# Patient Record
Sex: FEMALE | Race: WHITE | NOT HISPANIC OR LATINO | URBAN - METROPOLITAN AREA
[De-identification: names, ages, dates, MRNs, and addresses within clinical notes are randomized per-mention and may not be internally consistent; named-entity substitution may affect disease eponyms.]

---

## 2017-10-14 ENCOUNTER — EMERGENCY (EMERGENCY)
Facility: HOSPITAL | Age: 42
LOS: 1 days | Discharge: HOME | End: 2017-10-14
Attending: EMERGENCY MEDICINE | Admitting: EMERGENCY MEDICINE
Payer: COMMERCIAL

## 2017-10-14 VITALS
SYSTOLIC BLOOD PRESSURE: 141 MMHG | OXYGEN SATURATION: 100 % | RESPIRATION RATE: 18 BRPM | TEMPERATURE: 98 F | HEART RATE: 58 BPM | DIASTOLIC BLOOD PRESSURE: 65 MMHG

## 2017-10-14 VITALS
HEIGHT: 67 IN | WEIGHT: 149.91 LBS | TEMPERATURE: 98 F | DIASTOLIC BLOOD PRESSURE: 87 MMHG | RESPIRATION RATE: 17 BRPM | SYSTOLIC BLOOD PRESSURE: 120 MMHG | OXYGEN SATURATION: 100 % | HEART RATE: 87 BPM

## 2017-10-14 LAB
ALBUMIN SERPL ELPH-MCNC: 4.2 G/DL — SIGNIFICANT CHANGE UP (ref 3.4–5)
ALP SERPL-CCNC: 69 U/L — SIGNIFICANT CHANGE UP (ref 40–120)
ALT FLD-CCNC: 58 U/L — HIGH (ref 12–42)
ANION GAP SERPL CALC-SCNC: 10 MMOL/L — SIGNIFICANT CHANGE UP (ref 9–16)
APTT BLD: 29.3 SEC — SIGNIFICANT CHANGE UP (ref 27.5–36.5)
AST SERPL-CCNC: 54 U/L — HIGH (ref 15–37)
BASOPHILS NFR BLD AUTO: 0.4 % — SIGNIFICANT CHANGE UP (ref 0–2)
BILIRUB SERPL-MCNC: 0.3 MG/DL — SIGNIFICANT CHANGE UP (ref 0.2–1.2)
BUN SERPL-MCNC: 16 MG/DL — SIGNIFICANT CHANGE UP (ref 7–23)
CALCIUM SERPL-MCNC: 9.2 MG/DL — SIGNIFICANT CHANGE UP (ref 8.5–10.5)
CHLORIDE SERPL-SCNC: 103 MMOL/L — SIGNIFICANT CHANGE UP (ref 96–108)
CO2 SERPL-SCNC: 25 MMOL/L — SIGNIFICANT CHANGE UP (ref 22–31)
CREAT SERPL-MCNC: 0.73 MG/DL — SIGNIFICANT CHANGE UP (ref 0.5–1.3)
EOSINOPHIL NFR BLD AUTO: 2 % — SIGNIFICANT CHANGE UP (ref 0–6)
GLUCOSE SERPL-MCNC: 116 MG/DL — HIGH (ref 70–99)
HCG SERPL-ACNC: 7 MIU/ML — SIGNIFICANT CHANGE UP
HCT VFR BLD CALC: 34.7 % — SIGNIFICANT CHANGE UP (ref 34.5–45)
HGB BLD-MCNC: 12 G/DL — SIGNIFICANT CHANGE UP (ref 11.5–15.5)
IMM GRANULOCYTES NFR BLD AUTO: 0.4 % — SIGNIFICANT CHANGE UP (ref 0–1.5)
INR BLD: 0.97 — SIGNIFICANT CHANGE UP (ref 0.88–1.16)
LYMPHOCYTES # BLD AUTO: 24.4 % — SIGNIFICANT CHANGE UP (ref 13–44)
MCHC RBC-ENTMCNC: 32.3 PG — SIGNIFICANT CHANGE UP (ref 27–34)
MCHC RBC-ENTMCNC: 34.6 G/DL — SIGNIFICANT CHANGE UP (ref 32–36)
MCV RBC AUTO: 93.5 FL — SIGNIFICANT CHANGE UP (ref 80–100)
MONOCYTES NFR BLD AUTO: 8.1 % — SIGNIFICANT CHANGE UP (ref 2–14)
NEUTROPHILS NFR BLD AUTO: 64.7 % — SIGNIFICANT CHANGE UP (ref 43–77)
PLATELET # BLD AUTO: 267 K/UL — SIGNIFICANT CHANGE UP (ref 150–400)
POTASSIUM SERPL-MCNC: 3.8 MMOL/L — SIGNIFICANT CHANGE UP (ref 3.5–5.3)
POTASSIUM SERPL-SCNC: 3.8 MMOL/L — SIGNIFICANT CHANGE UP (ref 3.5–5.3)
PROT SERPL-MCNC: 7.6 G/DL — SIGNIFICANT CHANGE UP (ref 6.4–8.2)
PROTHROM AB SERPL-ACNC: 10.7 SEC — SIGNIFICANT CHANGE UP (ref 9.8–12.7)
RBC # BLD: 3.71 M/UL — LOW (ref 3.8–5.2)
RBC # FLD: 11.9 % — SIGNIFICANT CHANGE UP (ref 10.3–16.9)
SODIUM SERPL-SCNC: 138 MMOL/L — SIGNIFICANT CHANGE UP (ref 132–145)
WBC # BLD: 7.9 K/UL — SIGNIFICANT CHANGE UP (ref 3.8–10.5)
WBC # FLD AUTO: 7.9 K/UL — SIGNIFICANT CHANGE UP (ref 3.8–10.5)

## 2017-10-14 PROCEDURE — 93010 ELECTROCARDIOGRAM REPORT: CPT | Mod: NC

## 2017-10-14 PROCEDURE — 99285 EMERGENCY DEPT VISIT HI MDM: CPT | Mod: 25

## 2017-10-14 RX ORDER — SODIUM CHLORIDE 9 MG/ML
1000 INJECTION INTRAMUSCULAR; INTRAVENOUS; SUBCUTANEOUS ONCE
Qty: 0 | Refills: 0 | Status: COMPLETED | OUTPATIENT
Start: 2017-10-14 | End: 2017-10-14

## 2017-10-14 RX ADMIN — SODIUM CHLORIDE 1000 MILLILITER(S): 9 INJECTION INTRAMUSCULAR; INTRAVENOUS; SUBCUTANEOUS at 21:07

## 2017-10-14 NOTE — ED ADULT NURSE NOTE - OBJECTIVE STATEMENT
42y female, with complaint of vaginal bleeding. Pt is , came in the ed ambulatory, with complaint of vaginal bleeding which happened 30mins PTA. State she was at the gym, when she starting having heavy vaginal bleeding. Pt is 6weeks post partum, on lovenox. Pt also said during her delivery she had a placenta accreta, manual delivery of placenta was done. denies chest pain/SOB/dizziness at this time.  Pt evaluated with by MD Alfaro.18g IV access in placed to right AC. Fluids running.

## 2017-10-14 NOTE — ED PROVIDER NOTE - NOTES
spoke to OB dr wilkerson, advised to send patient to ed for sono and obgyn evaluation. labs are stable for transfer. spoke to dr davis in ed, accepting and radiology resident aware patient history and need for sono.

## 2017-10-14 NOTE — ED ADULT TRIAGE NOTE - CHIEF COMPLAINT QUOTE
patient is 6 mos post partum with placenta accreta and bleeding post partum with  D and C done. Been okay until today when patient had sudden onset of vaginal bleeding.

## 2017-10-14 NOTE — ED PROVIDER NOTE - OBJECTIVE STATEMENT
Patient is a , 6 weeks postpartum after IVF, on lovenox 40 mg SQ daily, presents with acute onset vaginal bleeding while working out at the gym. patient notes bright red blood from vagina, with clots and visible tissue products. patient notes during vaginal delivery, complicated with placenta accreta, and required manual delivery of placenta, no d&c done in OR, and no transfusion. placed on iron after delivery and Hg has improved since then. pregnancy was full term, and otherwise no complication. breastfeeding at this time. denies cp, sob, or palpitations. denies hx of pelvic or abdominal surgeries.

## 2017-10-15 ENCOUNTER — EMERGENCY (EMERGENCY)
Facility: HOSPITAL | Age: 42
LOS: 1 days | Discharge: HOME | End: 2017-10-15
Attending: EMERGENCY MEDICINE | Admitting: EMERGENCY MEDICINE
Payer: COMMERCIAL

## 2017-10-15 VITALS
RESPIRATION RATE: 18 BRPM | OXYGEN SATURATION: 98 % | HEART RATE: 92 BPM | DIASTOLIC BLOOD PRESSURE: 56 MMHG | TEMPERATURE: 97 F | SYSTOLIC BLOOD PRESSURE: 92 MMHG

## 2017-10-15 VITALS
SYSTOLIC BLOOD PRESSURE: 92 MMHG | TEMPERATURE: 98 F | OXYGEN SATURATION: 97 % | HEART RATE: 51 BPM | DIASTOLIC BLOOD PRESSURE: 55 MMHG | RESPIRATION RATE: 18 BRPM

## 2017-10-15 LAB
ANION GAP SERPL CALC-SCNC: 13 MMOL/L — SIGNIFICANT CHANGE UP (ref 5–17)
BASOPHILS NFR BLD AUTO: 0.3 % — SIGNIFICANT CHANGE UP (ref 0–2)
BASOPHILS NFR BLD AUTO: 0.4 % — SIGNIFICANT CHANGE UP (ref 0–2)
BLD GP AB SCN SERPL QL: NEGATIVE — SIGNIFICANT CHANGE UP
BLD GP AB SCN SERPL QL: NEGATIVE — SIGNIFICANT CHANGE UP
BUN SERPL-MCNC: 13 MG/DL — SIGNIFICANT CHANGE UP (ref 7–23)
CALCIUM SERPL-MCNC: 8.8 MG/DL — SIGNIFICANT CHANGE UP (ref 8.4–10.5)
CHLORIDE SERPL-SCNC: 104 MMOL/L — SIGNIFICANT CHANGE UP (ref 96–108)
CO2 SERPL-SCNC: 24 MMOL/L — SIGNIFICANT CHANGE UP (ref 22–31)
CREAT SERPL-MCNC: 0.68 MG/DL — SIGNIFICANT CHANGE UP (ref 0.5–1.3)
EOSINOPHIL NFR BLD AUTO: 2.4 % — SIGNIFICANT CHANGE UP (ref 0–6)
EOSINOPHIL NFR BLD AUTO: 2.7 % — SIGNIFICANT CHANGE UP (ref 0–6)
GLUCOSE SERPL-MCNC: 111 MG/DL — HIGH (ref 70–99)
HCT VFR BLD CALC: 25.4 % — LOW (ref 34.5–45)
HCT VFR BLD CALC: 28.7 % — LOW (ref 34.5–45)
HGB BLD-MCNC: 8.4 G/DL — LOW (ref 11.5–15.5)
HGB BLD-MCNC: 9.7 G/DL — LOW (ref 11.5–15.5)
LYMPHOCYTES # BLD AUTO: 34 % — SIGNIFICANT CHANGE UP (ref 13–44)
LYMPHOCYTES # BLD AUTO: 36.7 % — SIGNIFICANT CHANGE UP (ref 13–44)
MCHC RBC-ENTMCNC: 31.5 PG — SIGNIFICANT CHANGE UP (ref 27–34)
MCHC RBC-ENTMCNC: 31.9 PG — SIGNIFICANT CHANGE UP (ref 27–34)
MCHC RBC-ENTMCNC: 33.1 G/DL — SIGNIFICANT CHANGE UP (ref 32–36)
MCHC RBC-ENTMCNC: 33.8 G/DL — SIGNIFICANT CHANGE UP (ref 32–36)
MCV RBC AUTO: 94.4 FL — SIGNIFICANT CHANGE UP (ref 80–100)
MCV RBC AUTO: 95.1 FL — SIGNIFICANT CHANGE UP (ref 80–100)
MONOCYTES NFR BLD AUTO: 14.1 % — HIGH (ref 2–14)
MONOCYTES NFR BLD AUTO: 9.5 % — SIGNIFICANT CHANGE UP (ref 2–14)
NEUTROPHILS NFR BLD AUTO: 48.8 % — SIGNIFICANT CHANGE UP (ref 43–77)
NEUTROPHILS NFR BLD AUTO: 51.1 % — SIGNIFICANT CHANGE UP (ref 43–77)
PLATELET # BLD AUTO: 219 K/UL — SIGNIFICANT CHANGE UP (ref 150–400)
PLATELET # BLD AUTO: 244 K/UL — SIGNIFICANT CHANGE UP (ref 150–400)
POTASSIUM SERPL-MCNC: 3.5 MMOL/L — SIGNIFICANT CHANGE UP (ref 3.5–5.3)
POTASSIUM SERPL-SCNC: 3.5 MMOL/L — SIGNIFICANT CHANGE UP (ref 3.5–5.3)
RBC # BLD: 2.67 M/UL — LOW (ref 3.8–5.2)
RBC # BLD: 3.04 M/UL — LOW (ref 3.8–5.2)
RBC # FLD: 11.9 % — SIGNIFICANT CHANGE UP (ref 10.3–16.9)
RBC # FLD: 12 % — SIGNIFICANT CHANGE UP (ref 10.3–16.9)
RH IG SCN BLD-IMP: POSITIVE — SIGNIFICANT CHANGE UP
SODIUM SERPL-SCNC: 141 MMOL/L — SIGNIFICANT CHANGE UP (ref 135–145)
WBC # BLD: 5.6 K/UL — SIGNIFICANT CHANGE UP (ref 3.8–10.5)
WBC # BLD: 6.1 K/UL — SIGNIFICANT CHANGE UP (ref 3.8–10.5)
WBC # FLD AUTO: 5.6 K/UL — SIGNIFICANT CHANGE UP (ref 3.8–10.5)
WBC # FLD AUTO: 6.1 K/UL — SIGNIFICANT CHANGE UP (ref 3.8–10.5)

## 2017-10-15 PROCEDURE — 86901 BLOOD TYPING SEROLOGIC RH(D): CPT

## 2017-10-15 PROCEDURE — 80048 BASIC METABOLIC PNL TOTAL CA: CPT

## 2017-10-15 PROCEDURE — 99284 EMERGENCY DEPT VISIT MOD MDM: CPT | Mod: 25

## 2017-10-15 PROCEDURE — 86850 RBC ANTIBODY SCREEN: CPT

## 2017-10-15 PROCEDURE — 76830 TRANSVAGINAL US NON-OB: CPT | Mod: 26

## 2017-10-15 PROCEDURE — 99285 EMERGENCY DEPT VISIT HI MDM: CPT | Mod: 25

## 2017-10-15 PROCEDURE — 84702 CHORIONIC GONADOTROPIN TEST: CPT

## 2017-10-15 PROCEDURE — 86900 BLOOD TYPING SEROLOGIC ABO: CPT

## 2017-10-15 PROCEDURE — 36415 COLL VENOUS BLD VENIPUNCTURE: CPT

## 2017-10-15 PROCEDURE — 76830 TRANSVAGINAL US NON-OB: CPT

## 2017-10-15 PROCEDURE — 85025 COMPLETE CBC W/AUTO DIFF WBC: CPT

## 2017-10-15 RX ORDER — SODIUM CHLORIDE 9 MG/ML
1000 INJECTION INTRAMUSCULAR; INTRAVENOUS; SUBCUTANEOUS ONCE
Qty: 0 | Refills: 0 | Status: COMPLETED | OUTPATIENT
Start: 2017-10-15 | End: 2017-10-15

## 2017-10-15 RX ADMIN — SODIUM CHLORIDE 1000 MILLILITER(S): 9 INJECTION INTRAMUSCULAR; INTRAVENOUS; SUBCUTANEOUS at 00:32

## 2017-10-15 RX ADMIN — Medication 0.2 MILLIGRAM(S): at 05:03

## 2017-10-15 NOTE — ED PROVIDER NOTE - MEDICAL DECISION MAKING DETAILS
post partum vaginal bleeding on lovenox during pregnancy and postpartum hypotension tachy, well appearing - Vag exam by GYN/  TV sono repeat labs post partum vaginal bleeding on lovenox during pregnancy and postpartum hypotension tachy, well appearing - Vag exam by GYN/  TV sono repeat labs   small clot in uterus  no active bleeding seen and eval by GYN  ok for dc  Hb 8.4  102/53  HR 49 pt ambulatory NAD no active bleeding  to see GYN MD in 1 day - return prec dw pt including recurrent bleeding dizziness nausea vomiting temp > 100.$ F

## 2017-10-15 NOTE — CONSULT NOTE ADULT - ASSESSMENT
42y P1 s/p   presents with heavy 42y P1 s/p   presents with heavy vaginal bleeding which has since resolved since being in the ED  - VSS throughout visit but heavy vaginal bleeding witnessed at begining of work up  - Speculum exam showed clot in cervical os and no active bleeding and therefore Hg is stable  - Pt anemic currently and has dropped Hg from 12 to 8.4 but is asymptomatic and bleeding stopped  - Pt took Lovenox less than 24hrs ago   - TVUS shows possible RPOC vs. blood clots  - Pt given the option of being admitted from D&C today or being discharged home to [facility] with OBGYN STAT on . All risks of bleeding given to pt before having her decide  - Pt requests to be discharged and to take train home   - Again VSS, no active bleeding  - Will discharge home with Methergine series and to return here or another ER if bleeding restarts, she fells pain or symptoms of anemia (dizziness or weakness)  - Pt understands and agrees to plan    d/w Dr. Hitchcock

## 2017-10-15 NOTE — ED ADULT NURSE NOTE - OBJECTIVE STATEMENT
Pt transferred from [facility] for GYN CONSULT vis a vis SUDDEN VAG BLEED DURING WORK OUT AT GYM. Same is , PP x 6 months, given LOVENOX 2/2 hx DVT. Hx PLACENTA ACCRETA during pregnancy. Denied other complaint. Arrived to ED in [facility].

## 2017-10-15 NOTE — ED PROVIDER NOTE - OBJECTIVE STATEMENT
41 yo F is a , 6 weeks postpartum after IVF, on Lovenox 40 mg SQ daily- last dose today-- hx of prior DVT 7 years ago while flying back from [place] - presents with acute onset vaginal bleeding while working out at the gym- pos BRB with clots/ tissue ? POC-  no syncope or chest pain or sob  no palpitations-- no abd cramping--. patient notes during vaginal delivery, complicated with placenta accreta, and required manual delivery of placenta, no D/C done in OR, and no transfusion. placed on iron after delivery and Hg has improved since then. pregnancy was full term, and otherwise no complication. breastfeeding at this time. denies cp, sob, or palpitations. denies hx of pelvic or abdominal surgeries.

## 2017-10-15 NOTE — ED PROVIDER NOTE - PROGRESS NOTE DETAILS
[employer] rx given to pt- explained risks assoc with breastfeeding and to infant-advised to use formula for now if at all possible rx given to pt as well

## 2017-10-15 NOTE — CONSULT NOTE ADULT - SUBJECTIVE AND OBJECTIVE BOX
42y  s/p  on  in [place] with OBGYN Dr. Tierney Yoo presents to ED from [facility] with heavy vaginal bleeding after going to the gym. Pt in in [place] for a work event and decided to go to the gyn before. When she was leaving, she felt a gush of fluid and realized it was blood. The bleeding soaked her pants and ran down her leg. At that time she called her OBGYN and went to  ED and was then transferred here. In their ED her Hg was 12 and her VS were stable. She currently denies any n/v, weakness, dizziness, SOB or pain. Pt delivered a healthy baby girl in [place] on  but the delivery was complicated by a placenta accreta with manual removal of placenta. Pt has been on Lovenox since her second trimester due to her hx of DVT about 7 years ago. Her DVT was due to pt having an IUD and being on a plane for 12 hours.     PAST MEDICAL & SURGICAL HISTORY:  Multiple Colonoscopies for abnormal pap  DVT around     MEDICATIONS:  Lovenox 40mg daily  Synthroid 75mcg  PNV    Allergies    aspirin (Rash)  penicillins (Rash)    PHYSICAL EXAM:   Vital Signs Last 24 Hrs  T(C): 36.3 (15 Oct 2017 00:09), Max: 36.7 (14 Oct 2017 20:40)  T(F): 97.4 (15 Oct 2017 00:09), Max: 98 (14 Oct 2017 20:40)  HR: 92 (15 Oct 2017 00:09) (58 - 92)  BP: 92/56 (15 Oct 2017 00:09) (92/56 - 141/65)  BP(mean): --  RR: 18 (15 Oct 2017 00:09) (17 - 18)  SpO2: 98% (15 Oct 2017 00:09) (98% - 100%)    **************************  Constitutional: Alert & Oriented x3, No acute distress, cooperative   Respiratory: Clear to ausculation bilaterally; no wheezing, rhonchi, or crackles  Cardiovascular: S1 &S2 physiologic, no murmurs, or gallops  Gastrointestinal: soft, non tender, positive bowel sounds, no rebound or guarding   Speculum exam:   Pelvic exam:   Extremities: no calf tenderness or swelling      LABS:                        9.7    6.1   )-----------( 244      ( 15 Oct 2017 00:48 )             28.7     10-14    138  |  103  |  16  ----------------------------<  116<H>  3.8   |  25  |  0.73    Ca    9.2      14 Oct 2017 21:11    TPro  7.6  /  Alb  4.2  /  TBili  0.3  /  DBili  x   /  AST  54<H>  /  ALT  58<H>  /  AlkPhos  69  10-14    PT/INR - ( 14 Oct 2017 21:11 )   PT: 10.7 sec;   INR: 0.97          PTT - ( 14 Oct 2017 21:11 )  PTT:29.3 sec      RADIOLOGY & ADDITIONAL STUDIES: 42y  s/p  on  in [place] with OBGYN Dr. Tierney Yoo presents to ED from [facility] with heavy vaginal bleeding after going to the gym. Pt in in [place] for a work event and decided to go to the gyn before. When she was leaving, she felt a gush of fluid and realized it was blood. The bleeding soaked her pants and ran down her leg. At that time she called her OBGYN and went to  ED and was then transferred here. In their ED her Hg was 12 and her VS were stable. She currently denies any n/v, weakness, dizziness, SOB or pain. Pt delivered a healthy baby girl in [place] on  but the delivery was complicated by a placenta accreta with manual removal of placenta. Pt has been on Lovenox since her second trimester due to her hx of DVT about 7 years ago. Her DVT was due to pt having an IUD and being on a plane for 12 hours.     PAST MEDICAL & SURGICAL HISTORY:  Multiple Colonoscopies for abnormal pap  DVT around     MEDICATIONS:  Lovenox 40mg daily  Synthroid 75mcg  PNV    Allergies    aspirin (Rash)  penicillins (Rash)    PHYSICAL EXAM:   Vital Signs Last 24 Hrs  T(C): 36.3 (15 Oct 2017 00:09), Max: 36.7 (14 Oct 2017 20:40)  T(F): 97.4 (15 Oct 2017 00:09), Max: 98 (14 Oct 2017 20:40)  HR: 92 (15 Oct 2017 00:09) (58 - 92)  BP: 92/56 (15 Oct 2017 00:09) (92/56 - 141/65)  BP(mean): --  RR: 18 (15 Oct 2017 00:09) (17 - 18)  SpO2: 98% (15 Oct 2017 00:09) (98% - 100%)    **************************  Constitutional: Alert & Oriented x3, No acute distress, cooperative   Respiratory: Clear to ausculation bilaterally; no wheezing, rhonchi, or crackles  Cardiovascular: S1 &S2 physiologic, no murmurs, or gallops  Gastrointestinal: soft, non tender, positive bowel sounds, no rebound or guarding   Speculum exam: 5cc BRB with seen clot at cervical os. no active bleeding seen  Pelvic exam: No CMT.   Extremities: no calf tenderness or swelling      LABS:                        9.7    6.1   )-----------( 244      ( 15 Oct 2017 00:48 )             28.7     10-14    138  |  103  |  16  ----------------------------<  116<H>  3.8   |  25  |  0.73    Ca    9.2      14 Oct 2017 21:11    TPro  7.6  /  Alb  4.2  /  TBili  0.3  /  DBili  x   /  AST  54<H>  /  ALT  58<H>  /  AlkPhos  69  10-14    PT/INR - ( 14 Oct 2017 21:11 )   PT: 10.7 sec;   INR: 0.97          PTT - ( 14 Oct 2017 21:11 )  PTT:29.3 sec      RADIOLOGY & ADDITIONAL STUDIES:  < from: US Transvaginal (10.15.17 @ 02:15) >    EXAM:  US TRANSVAGINAL                          PROCEDURE DATE:  10/15/2017                     INTERPRETATION:  PELVIC ULTRASOUND - LIMITED dated 10/15/2017 2:15 AM    INDICATION: Vaginal bleeding with clots 6 weeks post partum. Vaginal   delivery. On Lovenox.    TECHNIQUE: Transvaginal views of the pelvis were obtained.      PRIOR STUDIES: None    FINDINGS:   These images demonstrate the uterus to be anteverted. The uterus is   normal in size and shape. Measured dimensions of the uterus are 8.7 x 5.3   x 6.7 cm. Accurate measurement of the uterus is limited as the lower   portion of the uterus is off the field-of-view. No myometrial   abnormalities are seen. A solid, heterogeneous, echogenic mass is within   the uterus. The majority of this mass is minimally vascular, although   there is a focal area of increased vascularity as seen on transverse view   at the junction of the uterine wall and mass. The endometrium is   thickened, measuring 2.3 cm.     The cervix appears closed, measuring 2.7 cm, although part of the cervix   may be off the field-of-view.    The right ovary is normal in size, measuring 2.7 x 2.4 x 1.5 cm. No right   ovarian masses are seen. The left ovary is normal in size, measuring 1.8   x 3.2 x 1.4 cm. No left ovarian masses are seen. Doppler evaluation   demonstrates flow to the right ovary with no evidence of torsion.   Arterial flow is demonstrated in the left ovary. Evaluation of flow to   the left ovary is limited due to patient discomfort.      IMPRESSION:   Solid, heterogeneous, echogenic mass within the uterus. Focal area of   increased vascularity at the junction of the uterine body wall and mass   suggestive of retained products of conception. Intrauterine blood clots   are also possible.            "Thank you for the opportunity to participate in the care of this   patient."    MIGDALIA SMITH M.D., RADIOLOGY RESIDENT  This document has been electronically signed.  JANELL WARNER M.D., ATTENDING RADIOLOGIST  This document has been electronically signed. Oct 15 2017  2:58AM                  < end of copied text > 42y  s/p  on  in [place] with OBGYN Dr. Tierney Yoo presents to ED from [facility] with heavy vaginal bleeding after going to the gym. Pt in in [place] for a work event and decided to go to the gyn before. When she was leaving, she felt a gush of fluid and realized it was blood. The bleeding soaked her pants and ran down her leg. At that time she called her OBGYN and went to  ED and was then transferred here. In their ED her Hg was 12 and her VS were stable. She currently denies any n/v, weakness, dizziness, SOB or pain. Pt delivered a healthy baby girl in [place] on  but the delivery was complicated by a placenta accreta with manual removal of placenta. Pt has been on Lovenox since her second trimester due to her hx of DVT about 7 years ago. Her DVT was due to pt having an IUD and being on a plane for 12 hours.     PAST MEDICAL & SURGICAL HISTORY:  Multiple Colonoscopies for abnormal pap  DVT around     MEDICATIONS:  Lovenox 40mg daily  Synthroid 75mcg  PNV    Allergies    aspirin (Rash)  penicillins (Rash)    PHYSICAL EXAM:   Vital Signs Last 24 Hrs  T(C): 36.3 (15 Oct 2017 00:09), Max: 36.7 (14 Oct 2017 20:40)  T(F): 97.4 (15 Oct 2017 00:09), Max: 98 (14 Oct 2017 20:40)  HR: 92 (15 Oct 2017 00:09) (58 - 92)  BP: 92/56 (15 Oct 2017 00:09) (92/56 - 141/65)  BP(mean): --  RR: 18 (15 Oct 2017 00:09) (17 - 18)  SpO2: 98% (15 Oct 2017 00:09) (98% - 100%)    **************************  Constitutional: Alert & Oriented x3, No acute distress, cooperative   Respiratory: Clear to ausculation bilaterally; no wheezing, rhonchi, or crackles  Cardiovascular: S1 &S2 physiologic, no murmurs, or gallops  Gastrointestinal: soft, non tender, positive bowel sounds, no rebound or guarding   Speculum exam: 5cc BRB with seen clot at cervical os. no active bleeding seen  Pelvic exam: No CMT.   Extremities: no calf tenderness or swelling      LABS:                        9.7    6.1   )-----------( 244      ( 15 Oct 2017 00:48 )             28.7                           8.4    5.6   )-----------( 219      ( 15 Oct 2017 03:52 )             25.4       10-14    138  |  103  |  16  ----------------------------<  116<H>  3.8   |  25  |  0.73    Ca    9.2      14 Oct 2017 21:11    TPro  7.6  /  Alb  4.2  /  TBili  0.3  /  DBili  x   /  AST  54<H>  /  ALT  58<H>  /  AlkPhos  69  10    PT/INR - ( 14 Oct 2017 21:11 )   PT: 10.7 sec;   INR: 0.97          PTT - ( 14 Oct 2017 21:11 )  PTT:29.3 sec      RADIOLOGY & ADDITIONAL STUDIES:  < from: US Transvaginal (10.15. @ 02:15) >    EXAM:  US TRANSVAGINAL                          PROCEDURE DATE:  10/15/2017                     INTERPRETATION:  PELVIC ULTRASOUND - LIMITED dated 10/15/2017 2:15 AM    INDICATION: Vaginal bleeding with clots 6 weeks post partum. Vaginal   delivery. On Lovenox.    TECHNIQUE: Transvaginal views of the pelvis were obtained.      PRIOR STUDIES: None    FINDINGS:   These images demonstrate the uterus to be anteverted. The uterus is   normal in size and shape. Measured dimensions of the uterus are 8.7 x 5.3   x 6.7 cm. Accurate measurement of the uterus is limited as the lower   portion of the uterus is off the field-of-view. No myometrial   abnormalities are seen. A solid, heterogeneous, echogenic mass is within   the uterus. The majority of this mass is minimally vascular, although   there is a focal area of increased vascularity as seen on transverse view   at the junction of the uterine wall and mass. The endometrium is   thickened, measuring 2.3 cm.     The cervix appears closed, measuring 2.7 cm, although part of the cervix   may be off the field-of-view.    The right ovary is normal in size, measuring 2.7 x 2.4 x 1.5 cm. No right   ovarian masses are seen. The left ovary is normal in size, measuring 1.8   x 3.2 x 1.4 cm. No left ovarian masses are seen. Doppler evaluation   demonstrates flow to the right ovary with no evidence of torsion.   Arterial flow is demonstrated in the left ovary. Evaluation of flow to   the left ovary is limited due to patient discomfort.      IMPRESSION:   Solid, heterogeneous, echogenic mass within the uterus. Focal area of   increased vascularity at the junction of the uterine body wall and mass   suggestive of retained products of conception. Intrauterine blood clots   are also possible.            "Thank you for the opportunity to participate in the care of this   patient."    MIGDALIA SMITH M.D., RADIOLOGY RESIDENT  This document has been electronically signed.  JANELL WARNER M.D., ATTENDING RADIOLOGIST  This document has been electronically signed. Oct 15 2017  2:58AM                  < end of copied text >

## 2017-10-18 DIAGNOSIS — N93.8 OTHER SPECIFIED ABNORMAL UTERINE AND VAGINAL BLEEDING: ICD-10-CM

## 2017-10-19 DIAGNOSIS — N93.9 ABNORMAL UTERINE AND VAGINAL BLEEDING, UNSPECIFIED: ICD-10-CM

## 2017-10-19 DIAGNOSIS — Z88.6 ALLERGY STATUS TO ANALGESIC AGENT: ICD-10-CM

## 2017-10-19 DIAGNOSIS — Z88.0 ALLERGY STATUS TO PENICILLIN: ICD-10-CM

## 2017-10-21 NOTE — ED PROVIDER NOTE - NEURO NEGATIVE STATEMENT, MLM
Launch [employer] and print the 'Prescriptions from this Visit' Report
no loss of consciousness, no gait abnormality, no headache, no sensory deficits, and no weakness.

## 2025-05-01 NOTE — ED ADULT NURSE NOTE - NS ED NURSE TRANSFER FORMS DT
Is This A New Presentation, Or A Follow-Up?: Skin Lesions Detail Level: Detailed Show Aperture Variable?: Yes Post-Care Instructions: I reviewed with the patient in detail post-care instructions. Patient is to wear sunprotection, and avoid picking at any of the treated lesions. Pt may apply Vaseline to crusted or scabbing areas. Duration Of Freeze Thaw-Cycle (Seconds): 0 Render Note In Bullet Format When Appropriate: No Consent: The patient's consent was obtained including but not limited to risks of crusting, scabbing, blistering, scarring, darker or lighter pigmentary change, recurrence, incomplete removal and infection. 14-Oct-2017 23:04